# Patient Record
Sex: FEMALE | Race: BLACK OR AFRICAN AMERICAN | NOT HISPANIC OR LATINO
[De-identification: names, ages, dates, MRNs, and addresses within clinical notes are randomized per-mention and may not be internally consistent; named-entity substitution may affect disease eponyms.]

---

## 2021-03-30 ENCOUNTER — NON-APPOINTMENT (OUTPATIENT)
Age: 61
End: 2021-03-30

## 2021-04-06 ENCOUNTER — NON-APPOINTMENT (OUTPATIENT)
Age: 61
End: 2021-04-06

## 2021-04-06 ENCOUNTER — APPOINTMENT (OUTPATIENT)
Dept: HEART AND VASCULAR | Facility: CLINIC | Age: 61
End: 2021-04-06
Payer: COMMERCIAL

## 2021-04-06 VITALS
DIASTOLIC BLOOD PRESSURE: 80 MMHG | BODY MASS INDEX: 35.36 KG/M2 | HEART RATE: 65 BPM | SYSTOLIC BLOOD PRESSURE: 132 MMHG | WEIGHT: 220 LBS | HEIGHT: 66 IN | TEMPERATURE: 97.8 F

## 2021-04-06 DIAGNOSIS — R06.00 DYSPNEA, UNSPECIFIED: ICD-10-CM

## 2021-04-06 PROBLEM — Z00.00 ENCOUNTER FOR PREVENTIVE HEALTH EXAMINATION: Status: ACTIVE | Noted: 2021-04-06

## 2021-04-06 PROCEDURE — 93000 ELECTROCARDIOGRAM COMPLETE: CPT

## 2021-04-06 PROCEDURE — 99203 OFFICE O/P NEW LOW 30 MIN: CPT

## 2021-04-06 PROCEDURE — 99072 ADDL SUPL MATRL&STAF TM PHE: CPT

## 2021-04-06 RX ORDER — METFORMIN HYDROCHLORIDE 500 MG/1
500 TABLET, COATED ORAL
Qty: 60 | Refills: 0 | Status: ACTIVE | COMMUNITY
Start: 2021-03-24

## 2021-04-06 RX ORDER — AMLODIPINE BESYLATE 10 MG/1
10 TABLET ORAL
Qty: 30 | Refills: 0 | Status: ACTIVE | COMMUNITY
Start: 2020-07-17

## 2021-04-06 RX ORDER — ATORVASTATIN CALCIUM 20 MG/1
20 TABLET, FILM COATED ORAL
Qty: 30 | Refills: 0 | Status: ACTIVE | COMMUNITY
Start: 2021-03-24

## 2021-04-06 NOTE — HISTORY OF PRESENT ILLNESS
[FreeTextEntry1] : Patient is 60 year old F with uncontrolled DM, HTN, HLD seen today for cardiac eval. Complaining of SOB with exertion (walking 3-4 blocks and walking on incline). Symptoms first began 6 -12 months ago, now progressively worse. SOB not associated with chest pressure/tightness. No dizziness, palpitations. No LOC

## 2021-04-06 NOTE — DISCUSSION/SUMMARY
[FreeTextEntry1] : Assessment/Plan:\par Patient is 60 year old F with uncontrolled DM, HTN, HLD seen today for cardiac eval. Complaining of SOB with exertion (walking 3-4 blocks and walking on incline). Symptoms first began 6 -12 months ago, now progressively worse. SOB not associated with chest pressure/tightness. No dizziness, palpitations. No LOC\par \par -Recent onset exertional SOB: EKG without ischemic changes. Never had ischemic work up in past. Given age, DM/HTN/HLD, plan for ECHO and CCTA - r/o CAD\par \par -HTN: BP controlled. Cont amlodipine\par \par -HLD: cont atorva. goal LDL 70\par \par FU 1 month for results

## 2021-04-06 NOTE — PHYSICAL EXAM
[General Appearance - Well Developed] : well developed [Normal Appearance] : normal appearance [Well Groomed] : well groomed [General Appearance - Well Nourished] : well nourished [No Deformities] : no deformities [General Appearance - In No Acute Distress] : no acute distress [Normal Conjunctiva] : the conjunctiva exhibited no abnormalities [Eyelids - No Xanthelasma] : the eyelids demonstrated no xanthelasmas [Normal Oral Mucosa] : normal oral mucosa [No Oral Pallor] : no oral pallor [No Oral Cyanosis] : no oral cyanosis [Normal Jugular Venous A Waves Present] : normal jugular venous A waves present [Normal Jugular Venous V Waves Present] : normal jugular venous V waves present [No Jugular Venous Mauro A Waves] : no jugular venous mauro A waves [Heart Rate And Rhythm] : heart rate and rhythm were normal [Heart Sounds] : normal S1 and S2 [Murmurs] : no murmurs present [Respiration, Rhythm And Depth] : normal respiratory rhythm and effort [Exaggerated Use Of Accessory Muscles For Inspiration] : no accessory muscle use [Auscultation Breath Sounds / Voice Sounds] : lungs were clear to auscultation bilaterally [Abdomen Soft] : soft [Abdomen Tenderness] : non-tender [Abdomen Mass (___ Cm)] : no abdominal mass palpated [Abnormal Walk] : normal gait [Gait - Sufficient For Exercise Testing] : the gait was sufficient for exercise testing [Nail Clubbing] : no clubbing of the fingernails [Cyanosis, Localized] : no localized cyanosis [Petechial Hemorrhages (___cm)] : no petechial hemorrhages [Skin Color & Pigmentation] : normal skin color and pigmentation [] : no rash [No Venous Stasis] : no venous stasis [Skin Lesions] : no skin lesions [No Skin Ulcers] : no skin ulcer [No Xanthoma] : no  xanthoma was observed [Oriented To Time, Place, And Person] : oriented to person, place, and time [Affect] : the affect was normal [Mood] : the mood was normal [No Anxiety] : not feeling anxious

## 2021-04-20 ENCOUNTER — TRANSCRIPTION ENCOUNTER (OUTPATIENT)
Age: 61
End: 2021-04-20

## 2021-05-25 ENCOUNTER — APPOINTMENT (OUTPATIENT)
Dept: HEART AND VASCULAR | Facility: CLINIC | Age: 61
End: 2021-05-25